# Patient Record
Sex: FEMALE | NOT HISPANIC OR LATINO | URBAN - METROPOLITAN AREA
[De-identification: names, ages, dates, MRNs, and addresses within clinical notes are randomized per-mention and may not be internally consistent; named-entity substitution may affect disease eponyms.]

---

## 2021-12-04 ENCOUNTER — EMERGENCY (EMERGENCY)
Facility: HOSPITAL | Age: 71
LOS: 1 days | Discharge: HOME | End: 2021-12-04
Admitting: EMERGENCY MEDICINE
Payer: MEDICARE

## 2021-12-04 VITALS
OXYGEN SATURATION: 98 % | SYSTOLIC BLOOD PRESSURE: 173 MMHG | DIASTOLIC BLOOD PRESSURE: 84 MMHG | HEART RATE: 88 BPM | RESPIRATION RATE: 17 BRPM | TEMPERATURE: 99 F
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
## 2021-12-04 DIAGNOSIS — S05.02XA INJURY OF CONJUNCTIVA AND CORNEAL ABRASION WITHOUT FOREIGN BODY, LEFT EYE, INITIAL ENCOUNTER: ICD-10-CM

## 2021-12-04 DIAGNOSIS — H57.89 OTHER SPECIFIED DISORDERS OF EYE AND ADNEXA: ICD-10-CM

## 2021-12-04 PROCEDURE — 99283 EMERGENCY DEPT VISIT LOW MDM: CPT

## 2021-12-04 RX ORDER — CIPROFLOXACIN HCL 0.3 %
1 DROPS OPHTHALMIC (EYE) ONCE
Refills: 0 | Status: COMPLETED | OUTPATIENT
Start: 2021-12-04 | End: 2021-12-04

## 2021-12-04 RX ADMIN — Medication 1 DROP(S): at 06:01

## 2021-12-04 NOTE — ED PROVIDER NOTE - PATIENT PORTAL LINK FT
You can access the FollowMyHealth Patient Portal offered by [facility] by registering at the following website: [email or web address]. By joining [employer]’s FollowMyHealth portal, you will also be able to view your health information using other applications (apps) compatible with our system.

## 2021-12-04 NOTE — ED PROVIDER NOTE - CLINICAL SUMMARY MEDICAL DECISION MAKING FREE TEXT BOX
70 yo female with irritation to left eyelids after she removed her artificial eyelashes tonight, concerned that eyelash get stuck under her eyelid. No eyelash or foreign body on exam, but + corneal abrasion. Will d/c home with opthalmic abx and recommend to f/u with ophthalmologist for re-evaluation. Returned precautions discussed.

## 2021-12-04 NOTE — ED PROVIDER NOTE - EYES, MLM
Clear bilaterally, pupils equal, round and reactive to light., left eye conjunctival erythema, edema and erythema to upper eyelid, no foreign body on fluorescent eye exam, small corneal abrasion+.

## 2021-12-04 NOTE — ED PROVIDER NOTE - NSFOLLOWUPINSTRUCTIONS_ED_ALL_ED_FT
Take medications as prescribed and f/u with ophtalmologist in 1-2 days for re-evaluation           CORNEAL ABRASION - General Information           Corneal Abrasion     WHAT YOU NEED TO KNOW:    What is a corneal abrasion? A corneal abrasion is a scratch on the cornea of your eye. The cornea is the clear layer that covers the front of your eye.    Eye Anatomy         What causes a corneal abrasion?   •Contact lenses that do not fit well, are damaged, or are worn too long      •A scratch or poke from a fingernail or objects such as a pencil or tree branch      •Objects such as dirt, sand, or metal shavings that get into your eye      •Rubbing your eyes too hard      •Chemical or flash burns caused by extreme heat      What are the signs and symptoms of a corneal abrasion?   •Pain, redness, or swelling of the eye      •Difficulty opening the eye      •More tears than usual      •A feeling that you have something in your eye      •Blurred vision      •Sensitivity to bright light      •Headache      How is a corneal abrasion diagnosed? Your healthcare provider will examine your eye. If you have something in your eye that is scratching your cornea, your healthcare provider will remove it. He or she may put dye in your eye and look at it with a light. The light and dye can help your provider see if your cornea has been scratched.             How is a corneal abrasion treated? You may be given antibiotic eyedrops or ointment to help prevent an eye infection. You may also be given eyedrops to decrease pain. A small scratch may heal in 1 to 2 days. Deeper or larger scratches may take longer to heal.    What can I do to care for my eyes?   •Get regular eye exams. Get your eyes checked at least every year.      •Eat healthy foods. Fresh fruits and vegetables that are rich in vitamins A and C may help with your vision. Foods such as sweet potatoes, apricots, and carrots are rich in nutrients for the eyes.  Sources of Vitamin A       Sources of Vitamin C           •Take care of your contacts or glasses. Store, clean, and use your contacts or glasses as directed. Replace your glasses or contact lenses as often as your healthcare provider suggests.      •Decrease eye strain. Rest your eyes, especially after you read or use a computer for long periods of time. Get plenty of sleep at night. Use lights that reduce glare in your home, school, or workplace.      •Wear dark sunglasses. This will help prevent pain and light sensitivity. Make sure the sunglasses have UVA and UVB protection. This will protect your eyes when you go outside.      •Use eyedrops safely. If your treatment plan includes eyedrops, it is important to use them as directed. Your provider may give you detailed instructions to follow. The eyedrops may also come with safety instructions. Follow all instructions to help prevent an infection. Do not touch the tip of the bottle to your eye. Germs from your eye can spread to the medicine bottle.  Steps 1 2 3 4           How can I help prevent corneal abrasions?   •Remove your contact lenses if your eyes feel dry or irritated.      •Wash your hands if you need to touch your eyes or your face.  Handwashing           •Trim your fingernails so you cannot scratch your eye.      •Wear protective eyewear when you work with chemicals, wood, dust, or metal.      •Wear protective eyewear when you play sports.      •Do not wear your contacts for longer than you should.      •Do not sleep with your contacts in.      •Do not wear glitter makeup. Glitter can easily get into your eyes and under contact lenses.      When should I call my doctor?   •Your eye pain or vision gets worse.      •You have yellow or green drainage from your eye.      •You have questions or concerns about your condition or care.      CARE AGREEMENT:    You have the right to help plan your care. Learn about your health condition and how it may be treated. Discuss treatment options with your healthcare providers to decide what care you want to receive. You always have the right to refuse treatment.

## 2021-12-04 NOTE — ED ADULT NURSE NOTE - OBJECTIVE STATEMENT
71y Female A&OX3 from home C/O abrasion of left eye from artificial eyelash "falling off and getting stuck under my eyelid". Left eye erythematous. No drainage. Denies visual changes, nor dizziness. Ambulates with a steady gait. Pt reports washing out eye at home before ED arrival. Unsure if eyelash was dislodge or removed.
